# Patient Record
Sex: FEMALE | Race: OTHER | HISPANIC OR LATINO | Employment: FULL TIME | URBAN - METROPOLITAN AREA
[De-identification: names, ages, dates, MRNs, and addresses within clinical notes are randomized per-mention and may not be internally consistent; named-entity substitution may affect disease eponyms.]

---
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
## 2019-09-18 ENCOUNTER — APPOINTMENT (OUTPATIENT)
Dept: RADIOLOGY | Facility: MEDICAL CENTER | Age: 19
End: 2019-09-18
Attending: EMERGENCY MEDICINE
Payer: COMMERCIAL

## 2019-09-18 ENCOUNTER — HOSPITAL ENCOUNTER (EMERGENCY)
Facility: MEDICAL CENTER | Age: 19
End: 2019-09-18
Attending: EMERGENCY MEDICINE
Payer: COMMERCIAL

## 2019-09-18 VITALS
RESPIRATION RATE: 18 BRPM | BODY MASS INDEX: 34.53 KG/M2 | TEMPERATURE: 97.3 F | OXYGEN SATURATION: 97 % | HEIGHT: 65 IN | HEART RATE: 98 BPM | SYSTOLIC BLOOD PRESSURE: 124 MMHG | WEIGHT: 207.23 LBS | DIASTOLIC BLOOD PRESSURE: 70 MMHG

## 2019-09-18 DIAGNOSIS — M25.532 LEFT WRIST PAIN: ICD-10-CM

## 2019-09-18 PROCEDURE — 99284 EMERGENCY DEPT VISIT MOD MDM: CPT

## 2019-09-18 PROCEDURE — 73110 X-RAY EXAM OF WRIST: CPT | Mod: LT

## 2019-09-18 RX ORDER — NAPROXEN 500 MG/1
500 TABLET ORAL 2 TIMES DAILY WITH MEALS
Qty: 60 TAB | Refills: 0 | Status: SHIPPED | OUTPATIENT
Start: 2019-09-18 | End: 2022-10-07

## 2019-09-18 NOTE — ED PROVIDER NOTES
"ED Provider Note    Scribed for Travis Aparicio M.D. by Eneida Ramirez. 9/18/2019, 8:37 AM.    Primary care provider: None noted  Means of arrival: Walk-in  History obtained from: Patient  History limited by: None    CHIEF COMPLAINT  Chief Complaint   Patient presents with   • Wrist Pain     left wrist stiffness/pain denies injury    • Arm Pain     pt c/o left arm pain/burning sensation increasing over 1 wk        HPI  Sapna Patrick is a 19 y.o. female who presents to the Emergency Department for evaluation of acute, constant left wrist pain onset one week ago with worsening severity over the past few days prompting her to visit the ED today for further evaluation of her condition. The patient reports that she first began to experienced pain in her left wrist, however she recently has now also developed pain across her entire left arm. She states that her pain initially felt sore upon onset but she now describes her pain as burning in nature. No exacerbating or alleviating factors were reported. The patient does not report taking any over the counter medications for her left wrist pain. She reports symptoms of tingling to the left arm and decreased flexion and extension of the left wrist. Further endorses decreased strength to the left hand, she states \"I feel like I don't have enough strength to hold anything with my left hand\".  The patient notes that she is right handed and that she is a  where she spends a large majority of the time typing on the computer. No major past medical or surgical history was reported. The patient does not report taking any daily prescribed medications and has no known allergies to medications.       REVIEW OF SYSTEMS  See HPI above     PAST MEDICAL HISTORY  No major past medical history was reported.    SURGICAL HISTORY  patient denies any surgical history    SOCIAL HISTORY  Social History     Tobacco Use   • Smoking status: Never Smoker   • Smokeless tobacco: Never " "Used   Substance Use Topics   • Alcohol use: No   • Drug use: No      Social History     Substance and Sexual Activity   Drug Use No       FAMILY HISTORY  History reviewed. No pertinent family history.    CURRENT MEDICATIONS  The patient denies taking any daily medications    ALLERGIES  No Known Allergies    PHYSICAL EXAM  VITAL SIGNS: /114 Comment: 156/100 second pressure  Pulse (!) 104   Temp 36.3 °C (97.3 °F) (Temporal)   Resp 18   Ht 1.651 m (5' 5\")   Wt 94 kg (207 lb 3.7 oz)   LMP 09/04/2019   SpO2 95%   BMI 34.49 kg/m²     Constitutional: Well developed, Well nourished, No acute distress, Non-toxic appearance.   HENT: Neck is nontender  Abdomen: Soft, nontender nondistended.   Skin: Warm, Dry, No erythema  Extremities: Left wrist: mild tenderness whole aspect of the left wrist, decreased range of motion secondary to pain, good extension and flexion of fingers, normal distal sensation in tact. The left arm, shoulder and elbow are nontender.   Musculoskeletal: Intact distal pulses, no clubbing, no cyanosis. See extremities for further details.   Neurologic: Alert & oriented x 3, Normal motor function, Normal sensory function, No focal deficits noted.       RADIOLOGY  DX-WRIST-COMPLETE 3+ LEFT   Final Result      No evidence of fracture or dislocation.        The radiologist's interpretation of all radiological studies have been reviewed by me.    COURSE & MEDICAL DECISION MAKING  Nursing notes, VS, PMSFHx reviewed in chart.    8:37 AM - Patient seen and examined at bedside. Discussed plan of care with patient which includes obtaining an x-ray of her left wrist to further evaluate her condition. If her x-ray results have no significant findings, she will be given a referral to an orthopedist. Patient verbalizes her understanding and agreement to the plan of care. Ordered Dx-wrist left to evaluate her symptoms.     9:21 AM Recheck. The patient was informed that her radiology results show no evidence " of fractures or dislocations. Her left wrist will be placed in a velcro wrist splint for immobilization purposes and she will be discharged with anti-inflammatory medications for pain control. The patient was also instructed to follow up with an Orthopedist for further evaluation of her condition. The patient is stable for discharge. The patient will be discharged with a prescription for Naprosyn 500 mg which is to be taken twice daily. She was given a referral to Dr. Membreno and instructed to immediately return to the ED if her symptoms worsen. Patient verbalizes their understanding and agreement to plan of discharge.       Decision Making:  Patient presents for evaluation.  Clinically the patient has no signs of deformities this could very well be an overuse injury.  I will start the patient on anti-inflammatories as well as a wrist brace.  The patient is to follow-up as above return as needed.    The patient will return for new or worsening symptoms and is stable at the time of discharge.    DISPOSITION:  Patient will be discharged home in stable condition.    FOLLOW UP:  Francesco Membreno M.D.  [address] [facility] [address]  [phone]    Schedule an appointment as soon as possible for a visit   For further evaluation, Return if any symptoms worsen      OUTPATIENT MEDICATIONS:  Discharge Medication List as of 9/18/2019  9:28 AM      START taking these medications    Details   naproxen (NAPROSYN) 500 MG Tab Take 1 Tab by mouth 2 times a day, with meals., Disp-60 Tab, R-0, Print Rx Paper             FINAL IMPRESSION  1. Left wrist pain          Eneida MONTELONGO), am scribing for, and in the presence of, Travis Aparicio M.D..    Electronically signed by: Eneida Moreno), 9/18/2019    Travis MONTELONGO M.D. personally performed the services described in this documentation, as scribed by Eneida Ramirez in my presence, and it is both accurate and complete.    E    The note accurately reflects  work and decisions made by me.  Travis Aparicio  9/18/2019  3:10 PM

## 2019-09-18 NOTE — ED TRIAGE NOTES
Pt to triage .  Chief Complaint   Patient presents with   • Wrist Pain     left wrist stiffness/pain denies injury    • Arm Pain     pt c/o left arm pain/burning sensation increasing over 1 wk

## 2019-09-18 NOTE — ED NOTES
Pt aox4, resp equal unlabored, moving all extremities. Splint applied to left wrist. Given prescription for naprosyn. Pt understands to follow up with pcp and return to ed for worsening symptoms.

## 2021-05-30 ENCOUNTER — HOSPITAL ENCOUNTER (OUTPATIENT)
Dept: HOSPITAL 8 - LDOP | Age: 21
LOS: 1 days | Discharge: HOME | End: 2021-05-31
Attending: OBSTETRICS & GYNECOLOGY
Payer: MEDICAID

## 2021-05-30 VITALS — WEIGHT: 229.28 LBS | HEIGHT: 64 IN | BODY MASS INDEX: 39.14 KG/M2

## 2021-05-30 VITALS — DIASTOLIC BLOOD PRESSURE: 72 MMHG | SYSTOLIC BLOOD PRESSURE: 130 MMHG

## 2021-05-30 DIAGNOSIS — Z3A.20: ICD-10-CM

## 2021-05-30 DIAGNOSIS — O26.892: Primary | ICD-10-CM

## 2021-05-30 DIAGNOSIS — R10.9: ICD-10-CM

## 2021-05-30 LAB — MICROSCOPIC: (no result)

## 2021-05-30 PROCEDURE — G0463 HOSPITAL OUTPT CLINIC VISIT: HCPCS

## 2021-05-30 PROCEDURE — 76817 TRANSVAGINAL US OBSTETRIC: CPT

## 2021-05-30 PROCEDURE — 81001 URINALYSIS AUTO W/SCOPE: CPT

## 2021-05-30 PROCEDURE — 99211 OFF/OP EST MAY X REQ PHY/QHP: CPT

## 2021-05-30 PROCEDURE — 87086 URINE CULTURE/COLONY COUNT: CPT

## 2021-05-30 PROCEDURE — 76815 OB US LIMITED FETUS(S): CPT

## 2021-08-31 ENCOUNTER — HOSPITAL ENCOUNTER (OUTPATIENT)
Dept: HOSPITAL 8 - LDOP | Age: 21
Discharge: HOME | End: 2021-08-31
Attending: OBSTETRICS & GYNECOLOGY
Payer: COMMERCIAL

## 2021-08-31 DIAGNOSIS — Z3A.33: ICD-10-CM

## 2021-08-31 DIAGNOSIS — O16.3: ICD-10-CM

## 2021-08-31 DIAGNOSIS — O98.513: Primary | ICD-10-CM

## 2021-08-31 LAB
ALBUMIN SERPL-MCNC: 2.7 G/DL (ref 3.4–5)
ALP SERPL-CCNC: 140 U/L (ref 45–117)
ALT SERPL-CCNC: 20 U/L (ref 12–78)
ANION GAP SERPL CALC-SCNC: 5 MMOL/L (ref 5–15)
BASOPHILS # BLD AUTO: 0 X10^3/UL (ref 0–0.1)
BASOPHILS NFR BLD AUTO: 1 % (ref 0–1)
BILIRUB SERPL-MCNC: 0.2 MG/DL (ref 0.2–1)
CALCIUM SERPL-MCNC: 8.7 MG/DL (ref 8.5–10.1)
CHLORIDE SERPL-SCNC: 109 MMOL/L (ref 98–107)
CREAT SERPL-MCNC: 0.46 MG/DL (ref 0.55–1.02)
EOSINOPHIL # BLD AUTO: 0.1 X10^3/UL (ref 0–0.4)
EOSINOPHIL NFR BLD AUTO: 1 % (ref 1–7)
ERYTHROCYTE [DISTWIDTH] IN BLOOD BY AUTOMATED COUNT: 14.4 % (ref 9.6–15.2)
HBV CORE IGM SERPL QL IA: 38 MG/DL (ref 0–12)
HBV SURFACE AB SER RIA-ACNC: 255 MG/DL
LYMPHOCYTES # BLD AUTO: 1.4 X10^3/UL (ref 1–3.4)
LYMPHOCYTES NFR BLD AUTO: 20 % (ref 22–44)
MCH RBC QN AUTO: 28.8 PG (ref 27–34.8)
MCHC RBC AUTO-ENTMCNC: 33.3 G/DL (ref 32.4–35.8)
MICROSCOPIC: (no result)
MONOCYTES # BLD AUTO: 0.5 X10^3/UL (ref 0.2–0.8)
MONOCYTES NFR BLD AUTO: 6 % (ref 2–9)
NEUTROPHILS # BLD AUTO: 5.2 X10^3/UL (ref 1.8–6.8)
NEUTROPHILS NFR BLD AUTO: 72 % (ref 42–75)
PLATELET # BLD AUTO: 287 X10^3/UL (ref 130–400)
PMV BLD AUTO: 8.8 FL (ref 7.4–10.4)
PROT SERPL-MCNC: 6.8 G/DL (ref 6.4–8.2)
RBC # BLD AUTO: 4.1 X10^6/UL (ref 3.82–5.3)

## 2021-08-31 PROCEDURE — 87635 SARS-COV-2 COVID-19 AMP PRB: CPT

## 2021-08-31 PROCEDURE — 84156 ASSAY OF PROTEIN URINE: CPT

## 2021-08-31 PROCEDURE — 80053 COMPREHEN METABOLIC PANEL: CPT

## 2021-08-31 PROCEDURE — 82570 ASSAY OF URINE CREATININE: CPT

## 2021-08-31 PROCEDURE — 82248 BILIRUBIN DIRECT: CPT

## 2021-08-31 PROCEDURE — 81001 URINALYSIS AUTO W/SCOPE: CPT

## 2021-08-31 PROCEDURE — 59025 FETAL NON-STRESS TEST: CPT

## 2021-08-31 PROCEDURE — 36415 COLL VENOUS BLD VENIPUNCTURE: CPT

## 2021-08-31 PROCEDURE — 85025 COMPLETE CBC W/AUTO DIFF WBC: CPT

## 2021-08-31 PROCEDURE — 84550 ASSAY OF BLOOD/URIC ACID: CPT

## 2022-06-21 ENCOUNTER — APPOINTMENT (OUTPATIENT)
Dept: RADIOLOGY | Facility: MEDICAL CENTER | Age: 22
End: 2022-06-21
Attending: OBSTETRICS & GYNECOLOGY
Payer: COMMERCIAL

## 2022-10-07 ENCOUNTER — PRE-ADMISSION TESTING (OUTPATIENT)
Dept: ADMISSIONS | Facility: MEDICAL CENTER | Age: 22
End: 2022-10-07
Attending: SURGERY
Payer: COMMERCIAL

## 2022-10-07 NOTE — PREPROCEDURE INSTRUCTIONS
Pre admit appointment via telephone. History and medications reviewed. Pre-op instructions given, hand outs reviewed and questions answered. Pre admit video emailed.    Anesthesia fasting guidelines reviewed. Patient instructed to continue regularly prescribed medications through the day before surgery. Per anesthesia protocol, patient instructed to take these medications with a sip of water the day of surgery: none.

## 2022-10-10 ENCOUNTER — APPOINTMENT (OUTPATIENT)
Dept: ADMISSIONS | Facility: MEDICAL CENTER | Age: 22
End: 2022-10-10
Attending: SURGERY
Payer: COMMERCIAL

## 2022-10-10 DIAGNOSIS — Z01.812 PRE-OPERATIVE LABORATORY EXAMINATION: ICD-10-CM

## 2022-10-10 LAB
ERYTHROCYTE [DISTWIDTH] IN BLOOD BY AUTOMATED COUNT: 44.9 FL (ref 35.9–50)
HCT VFR BLD AUTO: 41.7 % (ref 37–47)
HGB BLD-MCNC: 13.4 G/DL (ref 12–16)
MCH RBC QN AUTO: 27.2 PG (ref 27–33)
MCHC RBC AUTO-ENTMCNC: 32.1 G/DL (ref 33.6–35)
MCV RBC AUTO: 84.6 FL (ref 81.4–97.8)
PLATELET # BLD AUTO: 344 K/UL (ref 164–446)
PMV BLD AUTO: 10.8 FL (ref 9–12.9)
RBC # BLD AUTO: 4.93 M/UL (ref 4.2–5.4)
WBC # BLD AUTO: 6 K/UL (ref 4.8–10.8)

## 2022-10-10 PROCEDURE — 36415 COLL VENOUS BLD VENIPUNCTURE: CPT

## 2022-10-10 PROCEDURE — 85027 COMPLETE CBC AUTOMATED: CPT

## 2022-10-20 ENCOUNTER — ANESTHESIA EVENT (OUTPATIENT)
Dept: SURGERY | Facility: MEDICAL CENTER | Age: 22
End: 2022-10-20

## 2022-10-21 ENCOUNTER — HOSPITAL ENCOUNTER (OUTPATIENT)
Facility: MEDICAL CENTER | Age: 22
End: 2022-10-21
Attending: SURGERY | Admitting: SURGERY
Payer: COMMERCIAL

## 2022-10-21 ENCOUNTER — ANESTHESIA (OUTPATIENT)
Dept: SURGERY | Facility: MEDICAL CENTER | Age: 22
End: 2022-10-21
Payer: COMMERCIAL

## 2022-10-21 VITALS
TEMPERATURE: 97.5 F | RESPIRATION RATE: 16 BRPM | DIASTOLIC BLOOD PRESSURE: 75 MMHG | HEART RATE: 76 BPM | WEIGHT: 238.43 LBS | HEIGHT: 65 IN | OXYGEN SATURATION: 93 % | SYSTOLIC BLOOD PRESSURE: 140 MMHG | BODY MASS INDEX: 39.72 KG/M2

## 2022-10-21 DIAGNOSIS — G89.18 POST-OPERATIVE PAIN: ICD-10-CM

## 2022-10-21 PROBLEM — K43.2 INCISIONAL HERNIA: Status: ACTIVE | Noted: 2022-10-21

## 2022-10-21 LAB — HCG UR QL: NEGATIVE

## 2022-10-21 PROCEDURE — 160048 HCHG OR STATISTICAL LEVEL 1-5: Performed by: SURGERY

## 2022-10-21 PROCEDURE — 700102 HCHG RX REV CODE 250 W/ 637 OVERRIDE(OP): Performed by: ANESTHESIOLOGY

## 2022-10-21 PROCEDURE — 160046 HCHG PACU - 1ST 60 MINS PHASE II: Performed by: SURGERY

## 2022-10-21 PROCEDURE — 160002 HCHG RECOVERY MINUTES (STAT): Performed by: SURGERY

## 2022-10-21 PROCEDURE — C1781 MESH (IMPLANTABLE): HCPCS | Performed by: SURGERY

## 2022-10-21 PROCEDURE — 502714 HCHG ROBOTIC SURGERY SERVICES: Performed by: SURGERY

## 2022-10-21 PROCEDURE — A9270 NON-COVERED ITEM OR SERVICE: HCPCS | Performed by: ANESTHESIOLOGY

## 2022-10-21 PROCEDURE — 160009 HCHG ANES TIME/MIN: Performed by: SURGERY

## 2022-10-21 PROCEDURE — 160031 HCHG SURGERY MINUTES - 1ST 30 MINS LEVEL 5: Performed by: SURGERY

## 2022-10-21 PROCEDURE — 00840 ANES IPER PX LOWER ABD NOS: CPT | Performed by: ANESTHESIOLOGY

## 2022-10-21 PROCEDURE — 160042 HCHG SURGERY MINUTES - EA ADDL 1 MIN LEVEL 5: Performed by: SURGERY

## 2022-10-21 PROCEDURE — 700105 HCHG RX REV CODE 258: Performed by: SURGERY

## 2022-10-21 PROCEDURE — 700101 HCHG RX REV CODE 250: Performed by: ANESTHESIOLOGY

## 2022-10-21 PROCEDURE — 160025 RECOVERY II MINUTES (STATS): Performed by: SURGERY

## 2022-10-21 PROCEDURE — 700111 HCHG RX REV CODE 636 W/ 250 OVERRIDE (IP): Performed by: ANESTHESIOLOGY

## 2022-10-21 PROCEDURE — 160035 HCHG PACU - 1ST 60 MINS PHASE I: Performed by: SURGERY

## 2022-10-21 PROCEDURE — 81025 URINE PREGNANCY TEST: CPT

## 2022-10-21 PROCEDURE — 700101 HCHG RX REV CODE 250: Performed by: SURGERY

## 2022-10-21 DEVICE — MESH PROGRIP LAPROSCOPIC SELF FIXATING (1/CA): Type: IMPLANTABLE DEVICE | Status: FUNCTIONAL

## 2022-10-21 RX ORDER — CEFAZOLIN SODIUM 1 G/3ML
INJECTION, POWDER, FOR SOLUTION INTRAMUSCULAR; INTRAVENOUS PRN
Status: DISCONTINUED | OUTPATIENT
Start: 2022-10-21 | End: 2022-10-21 | Stop reason: SURG

## 2022-10-21 RX ORDER — ONDANSETRON 2 MG/ML
INJECTION INTRAMUSCULAR; INTRAVENOUS PRN
Status: DISCONTINUED | OUTPATIENT
Start: 2022-10-21 | End: 2022-10-21 | Stop reason: SURG

## 2022-10-21 RX ORDER — DIPHENHYDRAMINE HYDROCHLORIDE 50 MG/ML
12.5 INJECTION INTRAMUSCULAR; INTRAVENOUS
Status: DISCONTINUED | OUTPATIENT
Start: 2022-10-21 | End: 2022-10-21 | Stop reason: HOSPADM

## 2022-10-21 RX ORDER — SODIUM CHLORIDE, SODIUM LACTATE, POTASSIUM CHLORIDE, CALCIUM CHLORIDE 600; 310; 30; 20 MG/100ML; MG/100ML; MG/100ML; MG/100ML
INJECTION, SOLUTION INTRAVENOUS CONTINUOUS
Status: ACTIVE | OUTPATIENT
Start: 2022-10-21 | End: 2022-10-21

## 2022-10-21 RX ORDER — HYDROMORPHONE HYDROCHLORIDE 1 MG/ML
0.4 INJECTION, SOLUTION INTRAMUSCULAR; INTRAVENOUS; SUBCUTANEOUS
Status: DISCONTINUED | OUTPATIENT
Start: 2022-10-21 | End: 2022-10-21 | Stop reason: HOSPADM

## 2022-10-21 RX ORDER — OXYCODONE HCL 5 MG/5 ML
5 SOLUTION, ORAL ORAL
Status: DISCONTINUED | OUTPATIENT
Start: 2022-10-21 | End: 2022-10-21 | Stop reason: HOSPADM

## 2022-10-21 RX ORDER — SCOLOPAMINE TRANSDERMAL SYSTEM 1 MG/1
1 PATCH, EXTENDED RELEASE TRANSDERMAL
Status: DISCONTINUED | OUTPATIENT
Start: 2022-10-21 | End: 2022-10-21 | Stop reason: HOSPADM

## 2022-10-21 RX ORDER — SODIUM CHLORIDE, SODIUM LACTATE, POTASSIUM CHLORIDE, CALCIUM CHLORIDE 600; 310; 30; 20 MG/100ML; MG/100ML; MG/100ML; MG/100ML
INJECTION, SOLUTION INTRAVENOUS CONTINUOUS
Status: DISCONTINUED | OUTPATIENT
Start: 2022-10-21 | End: 2022-10-21 | Stop reason: HOSPADM

## 2022-10-21 RX ORDER — HALOPERIDOL 5 MG/ML
1 INJECTION INTRAMUSCULAR
Status: DISCONTINUED | OUTPATIENT
Start: 2022-10-21 | End: 2022-10-21 | Stop reason: HOSPADM

## 2022-10-21 RX ORDER — HYDROMORPHONE HYDROCHLORIDE 1 MG/ML
0.1 INJECTION, SOLUTION INTRAMUSCULAR; INTRAVENOUS; SUBCUTANEOUS
Status: DISCONTINUED | OUTPATIENT
Start: 2022-10-21 | End: 2022-10-21 | Stop reason: HOSPADM

## 2022-10-21 RX ORDER — ACETAMINOPHEN 500 MG
1000 TABLET ORAL ONCE
Status: COMPLETED | OUTPATIENT
Start: 2022-10-21 | End: 2022-10-21

## 2022-10-21 RX ORDER — BUPIVACAINE HYDROCHLORIDE AND EPINEPHRINE 5; 5 MG/ML; UG/ML
INJECTION, SOLUTION EPIDURAL; INTRACAUDAL; PERINEURAL
Status: DISCONTINUED | OUTPATIENT
Start: 2022-10-21 | End: 2022-10-21 | Stop reason: HOSPADM

## 2022-10-21 RX ORDER — DEXAMETHASONE SODIUM PHOSPHATE 4 MG/ML
INJECTION, SOLUTION INTRA-ARTICULAR; INTRALESIONAL; INTRAMUSCULAR; INTRAVENOUS; SOFT TISSUE PRN
Status: DISCONTINUED | OUTPATIENT
Start: 2022-10-21 | End: 2022-10-21 | Stop reason: SURG

## 2022-10-21 RX ORDER — ONDANSETRON 2 MG/ML
4 INJECTION INTRAMUSCULAR; INTRAVENOUS
Status: DISCONTINUED | OUTPATIENT
Start: 2022-10-21 | End: 2022-10-21 | Stop reason: HOSPADM

## 2022-10-21 RX ORDER — MEPERIDINE HYDROCHLORIDE 25 MG/ML
6.25 INJECTION INTRAMUSCULAR; INTRAVENOUS; SUBCUTANEOUS
Status: DISCONTINUED | OUTPATIENT
Start: 2022-10-21 | End: 2022-10-21 | Stop reason: HOSPADM

## 2022-10-21 RX ORDER — MIDAZOLAM HYDROCHLORIDE 1 MG/ML
INJECTION INTRAMUSCULAR; INTRAVENOUS PRN
Status: DISCONTINUED | OUTPATIENT
Start: 2022-10-21 | End: 2022-10-21 | Stop reason: SURG

## 2022-10-21 RX ORDER — HYDROMORPHONE HYDROCHLORIDE 2 MG/ML
INJECTION, SOLUTION INTRAMUSCULAR; INTRAVENOUS; SUBCUTANEOUS PRN
Status: DISCONTINUED | OUTPATIENT
Start: 2022-10-21 | End: 2022-10-21 | Stop reason: SURG

## 2022-10-21 RX ORDER — LIDOCAINE HYDROCHLORIDE 20 MG/ML
INJECTION, SOLUTION EPIDURAL; INFILTRATION; INTRACAUDAL; PERINEURAL PRN
Status: DISCONTINUED | OUTPATIENT
Start: 2022-10-21 | End: 2022-10-21 | Stop reason: SURG

## 2022-10-21 RX ORDER — ROCURONIUM BROMIDE 10 MG/ML
INJECTION, SOLUTION INTRAVENOUS PRN
Status: DISCONTINUED | OUTPATIENT
Start: 2022-10-21 | End: 2022-10-21 | Stop reason: SURG

## 2022-10-21 RX ORDER — LIDOCAINE HYDROCHLORIDE 40 MG/ML
SOLUTION TOPICAL PRN
Status: DISCONTINUED | OUTPATIENT
Start: 2022-10-21 | End: 2022-10-21 | Stop reason: SURG

## 2022-10-21 RX ORDER — CELECOXIB 200 MG/1
200 CAPSULE ORAL ONCE
Status: COMPLETED | OUTPATIENT
Start: 2022-10-21 | End: 2022-10-21

## 2022-10-21 RX ORDER — HYDROMORPHONE HYDROCHLORIDE 1 MG/ML
0.2 INJECTION, SOLUTION INTRAMUSCULAR; INTRAVENOUS; SUBCUTANEOUS
Status: DISCONTINUED | OUTPATIENT
Start: 2022-10-21 | End: 2022-10-21 | Stop reason: HOSPADM

## 2022-10-21 RX ORDER — OXYCODONE HCL 5 MG/5 ML
10 SOLUTION, ORAL ORAL
Status: DISCONTINUED | OUTPATIENT
Start: 2022-10-21 | End: 2022-10-21 | Stop reason: HOSPADM

## 2022-10-21 RX ORDER — OXYCODONE HYDROCHLORIDE 5 MG/1
5 TABLET ORAL EVERY 4 HOURS PRN
Qty: 30 TABLET | Refills: 0 | Status: SHIPPED | OUTPATIENT
Start: 2022-10-21 | End: 2022-10-28

## 2022-10-21 RX ADMIN — PROPOFOL 250 MG: 10 INJECTION, EMULSION INTRAVENOUS at 08:14

## 2022-10-21 RX ADMIN — LIDOCAINE HYDROCHLORIDE 0.5 ML: 10 INJECTION, SOLUTION EPIDURAL; INFILTRATION; INTRACAUDAL; PERINEURAL at 07:08

## 2022-10-21 RX ADMIN — HYDROMORPHONE HYDROCHLORIDE 0.5 MG: 2 INJECTION INTRAMUSCULAR; INTRAVENOUS; SUBCUTANEOUS at 09:25

## 2022-10-21 RX ADMIN — ROCURONIUM BROMIDE 10 MG: 10 INJECTION, SOLUTION INTRAVENOUS at 09:13

## 2022-10-21 RX ADMIN — FENTANYL CITRATE 50 MCG: 50 INJECTION, SOLUTION INTRAMUSCULAR; INTRAVENOUS at 08:09

## 2022-10-21 RX ADMIN — CEFAZOLIN 2 G: 330 INJECTION, POWDER, FOR SOLUTION INTRAMUSCULAR; INTRAVENOUS at 08:18

## 2022-10-21 RX ADMIN — MIDAZOLAM HYDROCHLORIDE 2 MG: 1 INJECTION, SOLUTION INTRAMUSCULAR; INTRAVENOUS at 08:09

## 2022-10-21 RX ADMIN — SODIUM CHLORIDE, POTASSIUM CHLORIDE, SODIUM LACTATE AND CALCIUM CHLORIDE: 600; 310; 30; 20 INJECTION, SOLUTION INTRAVENOUS at 07:08

## 2022-10-21 RX ADMIN — ONDANSETRON 4 MG: 2 INJECTION INTRAMUSCULAR; INTRAVENOUS at 09:23

## 2022-10-21 RX ADMIN — LIDOCAINE HYDROCHLORIDE 4 ML: 40 SOLUTION TOPICAL at 08:16

## 2022-10-21 RX ADMIN — HYDROMORPHONE HYDROCHLORIDE 0.5 MG: 2 INJECTION INTRAMUSCULAR; INTRAVENOUS; SUBCUTANEOUS at 09:29

## 2022-10-21 RX ADMIN — HYDROMORPHONE HYDROCHLORIDE 0.5 MG: 2 INJECTION INTRAMUSCULAR; INTRAVENOUS; SUBCUTANEOUS at 09:13

## 2022-10-21 RX ADMIN — DEXAMETHASONE SODIUM PHOSPHATE 8 MG: 4 INJECTION, SOLUTION INTRA-ARTICULAR; INTRALESIONAL; INTRAMUSCULAR; INTRAVENOUS; SOFT TISSUE at 08:20

## 2022-10-21 RX ADMIN — SUGAMMADEX 200 MG: 100 INJECTION, SOLUTION INTRAVENOUS at 09:27

## 2022-10-21 RX ADMIN — LIDOCAINE HYDROCHLORIDE 50 MG: 20 INJECTION, SOLUTION EPIDURAL; INFILTRATION; INTRACAUDAL at 08:33

## 2022-10-21 RX ADMIN — SCOPALAMINE 1 PATCH: 1 PATCH, EXTENDED RELEASE TRANSDERMAL at 07:07

## 2022-10-21 RX ADMIN — ACETAMINOPHEN 1000 MG: 500 TABLET ORAL at 07:08

## 2022-10-21 RX ADMIN — FENTANYL CITRATE 50 MCG: 50 INJECTION, SOLUTION INTRAMUSCULAR; INTRAVENOUS at 08:14

## 2022-10-21 RX ADMIN — ROCURONIUM BROMIDE 70 MG: 10 INJECTION, SOLUTION INTRAVENOUS at 08:14

## 2022-10-21 RX ADMIN — CELECOXIB 200 MG: 200 CAPSULE ORAL at 07:08

## 2022-10-21 RX ADMIN — LIDOCAINE HYDROCHLORIDE 50 MG: 20 INJECTION, SOLUTION EPIDURAL; INFILTRATION; INTRACAUDAL at 08:14

## 2022-10-21 ASSESSMENT — PAIN SCALES - GENERAL: PAIN_LEVEL: 0

## 2022-10-21 NOTE — OR NURSING
1026 Pt transferred to stage 2, assisted into clothing.        1105 Pt discharged home with  after an uneventful stay in stage 2. Discharge instructions reviewed, questions answered. PIV removed, tip intact. Pt escorted out in wheelchair by RN.

## 2022-10-21 NOTE — OR NURSING
Procedure, patient allergies and NPO status verified. Home med rec completed and belongings secured. Patient verbalizes understanding of pain scale, expected course of stay and plan of care.  IV access established. SCD placed on bilateral legs. Triple aim and site prep completed by CNA

## 2022-10-21 NOTE — ANESTHESIA PREPROCEDURE EVALUATION
Case: [id number] Date/Time: 10/21/22 0745    Procedure: ROBOTIC INCISIONAL HERNIA REPAIR    Pre-op diagnosis: VENTRAL INCISIONAL HERNIA    Location: [facility] OR 01 / SURGERY [facility]    Surgeons: Sabino Soares M.D.        21 yo F here for robotic incisional hernia repair.  Denies problems with anesthesia in the past.  No current CP/SOB/N/V symptoms.    NPO  B-HCG neg  Relevant Problems   No relevant active problems       Physical Exam    Airway   Mallampati: III  TM distance: >3 FB  Neck ROM: full       Cardiovascular - normal exam  Rhythm: regular  Rate: normal  (-) murmur     Dental - normal exam           Pulmonary - normal exam  Breath sounds clear to auscultation     Abdominal   (+) obese     Neurological - normal exam               Anesthesia Plan    ASA 2       Plan - general       Airway plan will be ETT          Induction: intravenous    Postoperative Plan: Postoperative administration of opioids is intended.    Pertinent diagnostic labs and testing reviewed    Informed Consent:    Anesthetic plan and risks discussed with patient and spouse.    Use of blood products discussed with: patient whom consented to blood products.

## 2022-10-21 NOTE — ANESTHESIA PROCEDURE NOTES
Airway    Date/Time: 10/21/2022 8:16 AM  Performed by: Kylah Marroquin M.D.  Authorized by: Kylah Marroquin M.D.     Location:  OR  Urgency:  Elective  Difficult Airway: No    Indications for Airway Management:  Anesthesia      Spontaneous Ventilation: absent    Sedation Level:  Deep  Preoxygenated: Yes    Patient Position:  Sniffing  Mask Difficulty Assessment:  2 - vent by mask + OA or adjuvant +/- NMBA  Final Airway Type:  Endotracheal airway  Final Endotracheal Airway:  ETT  Cuffed: Yes    Technique Used for Successful ETT Placement:  Direct laryngoscopy  Devices/Methods Used in Placement:  Intubating stylet    Insertion Site:  Oral  Blade Type:  Yaneli  Laryngoscope Blade/Videolaryngoscope Blade Size:  3  ETT Size (mm):  7.0  Measured from:  Teeth  ETT to Teeth (cm):  21  Placement Verified by: auscultation and capnometry    Cormack-Lehane Classification:  Grade IIa - partial view of glottis  Number of Attempts at Approach:  1

## 2022-10-21 NOTE — DISCHARGE INSTRUCTIONS
If any questions arise, call your provider, Sabino Soares M.D.[phone].  If your provider is not available, please feel free to call the Surgical Center at [phone].    MEDICATIONS: Resume taking daily medication.  Take prescribed pain medication with food.  If no medication is prescribed, you may take non-aspirin pain medication if needed.  PAIN MEDICATION CAN BE VERY CONSTIPATING.  Take a stool softener or laxative such as senokot, pericolace, or milk of magnesia if needed.    Last pain medication given at     What to Expect Post Anesthesia    Rest and take it easy for the first 24 hours.  A responsible adult is recommended to remain with you during that time.  It is normal to feel sleepy.  We encourage you to not do anything that requires balance, judgment or coordination.    FOR 24 HOURS DO NOT:  Drive, operate machinery or run household appliances.  Drink beer or alcoholic beverages.  Make important decisions or sign legal documents.    To avoid nausea, slowly advance diet as tolerated, avoiding spicy or greasy foods for the first day.  Add more substantial food to your diet according to your provider's instructions. INCREASE FLUIDS AND FIBER TO AVOID CONSTIPATION.    MILD FLU-LIKE SYMPTOMS ARE NORMAL.  YOU MAY EXPERIENCE GENERALIZED MUSCLE ACHES, THROAT IRRITATION, HEADACHE AND/OR SOME NAUSEA.    No heavy lifting x6 weeks (<15lbs)  Ok to shower tomorrrow, do not bath or swim for 2 weeks    Laparoscopic Hernia Repair, Care After  This sheet gives you information about how to care for yourself after your procedure. Your health care provider may also give you more specific instructions. If you have problems or questions, contact your health care provider.  What can I expect after the procedure?  After the procedure, it is common to have:  Pain, discomfort, or soreness.  Follow these instructions at home:  Incision care  Follow instructions from your health care provider about how to take care  of your incision. Make sure you:  Wash your hands with soap and water before you change your bandage (dressing) or before you touch your abdomen. If soap and water are not available, use hand .  Change your dressing as told by your health care provider.  Leave stitches (sutures), skin glue, or adhesive strips in place. These skin closures may need to stay in place for 2 weeks or longer. If adhesive strip edges start to loosen and curl up, you may trim the loose edges. Do not remove adhesive strips completely unless your health care provider tells you to do that.  Check your incision area every day for signs of infection. Check for:  Redness, swelling, or pain.  Fluid or blood.  Warmth.  Pus or a bad smell.  Bathing  Do not take baths, swim, or use a hot tub until your health care provider approves. Ask your health care provider if you can take showers. You may only be allowed to take sponge baths for bathing.  Keep your bandage (dressing) dry until your health care provider says it can be removed.  Activity  Do not lift anything that is heavier than 10 lb (4.5 kg) until your health care provider approves.  Do not drive or use heavy machinery while taking prescription pain medicine. Ask your health care provider when it is safe for you to drive or use heavy machinery.  Do not drive for 24 hours if you were given a medicine to help you relax (sedative) during your procedure.  Rest as told by your health care provider. You may return to your normal activities when your health care provider approves.  General instructions  Take over-the-counter and prescription medicines only as told by your health care provider.  To prevent or treat constipation while you are taking prescription pain medicine, your health care provider may recommend that you:  Take over-the-counter or prescription medicines.  Eat foods that are high in fiber, such as fresh fruits and vegetables, whole grains, and beans.  Limit foods that are  high in fat and processed sugars, such as fried and sweet foods.  Drink enough fluid to keep your urine clear or pale yellow.  Hold a pillow over your abdomen when you cough or sneeze. This helps with pain.  Keep all follow-up visits as told by your health care provider. This is important.  Contact a health care provider if:  You have:  A fever or chills.  Redness, swelling, or pain around your incision.  Fluid or blood coming from your incision.  Pus or a bad smell coming from your incision.  Pain that gets worse or does not get better with medicine.  Nausea or vomiting.  A cough.  Shortness of breath.  Your incision feels warm to the touch.  You have not had a bowel movement in three days.  You are not able to urinate.  Get help right away if:  You have severe pain in your abdomen.  You have persistent nausea and vomiting.  You have redness, warmth, or pain in your leg.  You have chest pain.  You have trouble breathing.  Summary  After this procedure, it is common to have pain, discomfort, or soreness.  Follow instructions from your health care provider about how to take care of your incision.  Check your incision area every day for signs of infection. Report any signs of infection to your health care provider.  Keep all follow-up visits as told by your health care provider. This is important.  This information is not intended to replace advice given to you by your health care provider. Make sure you discuss any questions you have with your health care provider.  Document Released: 12/04/2013 Document Revised: 11/30/2018 Document Reviewed: 08/09/2017  [employer] Patient Education © 2020 [employer]

## 2022-10-21 NOTE — ANESTHESIA TIME REPORT
Anesthesia Start and Stop Event Times     Date Time Event    10/21/2022 0749 Ready for Procedure     0809 Anesthesia Start     0936 Anesthesia Stop        Responsible Staff  10/21/22    Name Role Begin End    Kylah Marroquin M.D. Anesth 0809 0936        Overtime Reason:  no overtime (within assigned shift)    Comments:

## 2022-10-21 NOTE — OR NURSING
0934: Pt arrived to PACU. Respirations even and unlabored. VSS. Dressing clean, dry and intact, able to move all extremities, warm to touch.      0949: Respirations even and unlabored, pain 0/10 and tolerable, denies nausea, answering questions appropriately     1004: Respirations even and unlabored, pain 0/10 and tolerable, denies nausea,  updated and on his way back to waiting room    1019: Respirations even and unlabored, pain 0/10 and tolerable, denies nausea, tolerating water, meets stage II    1020: Report given to Marissa ORTEGA Stage II    1026: transferred to stage II

## 2022-10-21 NOTE — ANESTHESIA POSTPROCEDURE EVALUATION
Patient: Sapna Patrick    Procedure Summary     Date: 10/21/22 Room / Location: [place] [address] / SURGERY [facility]    Anesthesia Start: 0809 Anesthesia Stop:     Procedure: ROBOTIC INCISIONAL HERNIA REPAIR Diagnosis: (VENTRAL INCISIONAL HERNIA)    Surgeons: Sabino Soares M.D. Responsible Provider: Kylah Marroquin M.D.    Anesthesia Type: general ASA Status: 2          Final Anesthesia Type: general  Last vitals  BP   150/60   Temp   36.8   Pulse   70   Resp   16    SpO2   100%     Anesthesia Post Evaluation    Patient location during evaluation: PACU  Patient participation: complete - patient participated  Level of consciousness: sleepy but conscious  Pain score: 0    Airway patency: patent  Anesthetic complications: no  Cardiovascular status: hemodynamically stable  Respiratory status: acceptable  Hydration status: euvolemic    PONV: none          No notable events documented.     Nurse Pain Score: 0 (NPRS)

## 2022-10-21 NOTE — OP REPORT
OP Note    PreOp Diagnosis:   1.  Left lower quadrant incisional hernia     PostOp Diagnosis:   1.  Left lower quadrant incisional hernia    Procedure(s):  1.  Robotic assisted laparoscopic incisional hernia repair left lower quadrant    - Wound Class: Clean    Surgeon:  Sabino Soares MD    Assistant:  Ammy Barton MD    Anesthesiologist/Type of Anesthesia:  Anesthesiologist: Kylah Marroquin M.D./General    Surgical Staff:  Circulator: Rashmi Dyson Circulator: Aimee Cavazos  Scrub Person: Benjamin Reyes    Specimens removed if any:  * No specimens in log *    Estimated Blood Loss: 20 cc h    Findings: Left lower quadrant incisional hernia.  The use of an assistant was required for assistance with docking of the robot, exchange of instruments, exposure and closure of incisions.    Complications: None observed    [employer]  The patient was met in the preoperative holding area where all of the potential risks and benefits of the procedure were discussed in detail with the patient. All of her questions were answered to her satisfaction and informed consent was signed. The patient was taken back to the operating room and placed supine on the operating room table. General endotracheal anesthesia was induced and all of the patients pressure points were padded appropriately. The patients abdomen was prepped and draped in the usual sterile fashion.  A timeout was performed which correctly identified the patient and the procedure being performed and preoperative antibiotics were given according to SCIP guidelines.     The procedure was started with infiltration of 0.5% marcaine into the right upper quadrant skin. An 8mm incision was made and carried down to fascia. The verress needle was inserted into the abdominal cavity and insufflation carried out to 15mmHg.  An 8mm robotic trochar was inserted into the abdominal cavity. The camera was inserted into the abdominal cavity and a brief survey of  the abdominal cavity demonstrated no obvious injuries or pathologies other than left lower quadrant incisional hernia with incarcerated omentum.  Two additional 8mm robotic trochars were then placed along the right abdominal wall after the skin and peritoneum were infiltrated with 0.5% marcaine with epinephrine. The robot was then docked and I took my position at the console.    I created a preperitoneal flap using a combination of electrocautery and sharp dissection. I dissected to the hernia which I reduced under direct visualization. It was fat-containing. I created a large preperitoneal space with care not to make defects in the peritoneum. I then used a  23 cm V-Loc Stratisfix suture to reapproximate the hernia defect and used a measuring device to measure the preperitoneal space. I used a 10 x 15 cm ProGrip mesh which I fashioned a fit the preperitoneal space at cm x 8cm. This had excellent coverage of the defect and laid flat against the abdominal wall. I closed the the peritoneal flap with a 2-0 Stratisfix suture. I used vicryl to close small rents in the peritoneum made during dissection.  This completely had the mesh from the abdominal cavity. Hemostasis was meticulously achieved. The needles were withdrawn under direct visualization.  The robot was then undocked.  Monocryl suture was used to re-approximate the skin edges. Dermabond was applied as a dressing.     The patient tolerated the procedure well and was extubated at the conclusion of the case without incident. All of the sponge, needle and instrument counts were correct at the conclusion of the case. After he was awoken from anesthesia he was taken to the recovery room in stable condition.         10/21/2022 9:32 AM Sabino Soares M.D.

## 2023-02-28 ENCOUNTER — HOSPITAL ENCOUNTER (EMERGENCY)
Facility: MEDICAL CENTER | Age: 23
End: 2023-02-28
Attending: EMERGENCY MEDICINE
Payer: COMMERCIAL

## 2023-02-28 ENCOUNTER — APPOINTMENT (OUTPATIENT)
Dept: RADIOLOGY | Facility: MEDICAL CENTER | Age: 23
End: 2023-02-28
Attending: EMERGENCY MEDICINE

## 2023-02-28 VITALS
WEIGHT: 247.8 LBS | BODY MASS INDEX: 38.89 KG/M2 | OXYGEN SATURATION: 99 % | DIASTOLIC BLOOD PRESSURE: 81 MMHG | SYSTOLIC BLOOD PRESSURE: 126 MMHG | TEMPERATURE: 98.5 F | HEART RATE: 75 BPM | HEIGHT: 67 IN | RESPIRATION RATE: 18 BRPM
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
## 2023-02-28 DIAGNOSIS — S09.90XA CLOSED HEAD INJURY, INITIAL ENCOUNTER: ICD-10-CM

## 2023-02-28 DIAGNOSIS — S16.1XXA STRAIN OF NECK MUSCLE, INITIAL ENCOUNTER: ICD-10-CM

## 2023-02-28 DIAGNOSIS — S43.401A SPRAIN OF RIGHT SHOULDER, UNSPECIFIED SHOULDER SPRAIN TYPE, INITIAL ENCOUNTER: ICD-10-CM

## 2023-02-28 PROCEDURE — 72125 CT NECK SPINE W/O DYE: CPT

## 2023-02-28 PROCEDURE — 73030 X-RAY EXAM OF SHOULDER: CPT | Mod: RT

## 2023-02-28 PROCEDURE — 70450 CT HEAD/BRAIN W/O DYE: CPT

## 2023-02-28 PROCEDURE — 99284 EMERGENCY DEPT VISIT MOD MDM: CPT

## 2023-02-28 ASSESSMENT — FIBROSIS 4 INDEX: FIB4 SCORE: 0.17
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
## 2024-06-22 ENCOUNTER — APPOINTMENT (OUTPATIENT)
Dept: RADIOLOGY | Facility: MEDICAL CENTER | Age: 24
End: 2024-06-22
Attending: FAMILY MEDICINE
Payer: COMMERCIAL

## 2024-08-17 ENCOUNTER — HOSPITAL ENCOUNTER (OUTPATIENT)
Dept: RADIOLOGY | Facility: MEDICAL CENTER | Age: 24
End: 2024-08-17
Attending: FAMILY MEDICINE
Payer: COMMERCIAL

## 2024-08-17 PROCEDURE — 70553 MRI BRAIN STEM W/O & W/DYE: CPT

## 2024-08-17 PROCEDURE — A9579 GAD-BASE MR CONTRAST NOS,1ML: HCPCS | Mod: JZ | Performed by: FAMILY MEDICINE

## 2024-08-17 PROCEDURE — 700117 HCHG RX CONTRAST REV CODE 255: Mod: JZ | Performed by: FAMILY MEDICINE

## 2024-08-17 RX ADMIN — GADOTERIDOL 20 ML: 279.3 INJECTION, SOLUTION INTRAVENOUS at 08:17

## 2024-11-29 ENCOUNTER — OFFICE VISIT (OUTPATIENT)
Dept: URGENT CARE | Facility: PHYSICIAN GROUP | Age: 24
End: 2024-11-29
Payer: COMMERCIAL

## 2024-11-29 VITALS
HEIGHT: 66 IN | SYSTOLIC BLOOD PRESSURE: 102 MMHG | OXYGEN SATURATION: 96 % | BODY MASS INDEX: 42.11 KG/M2 | DIASTOLIC BLOOD PRESSURE: 64 MMHG | HEART RATE: 73 BPM | WEIGHT: 262 LBS | RESPIRATION RATE: 18 BRPM | TEMPERATURE: 97.3 F

## 2024-11-29 DIAGNOSIS — R06.2 WHEEZE: ICD-10-CM

## 2024-11-29 DIAGNOSIS — R05.1 ACUTE COUGH: ICD-10-CM

## 2024-11-29 RX ORDER — DEXTROMETHORPHAN HYDROBROMIDE AND PROMETHAZINE HYDROCHLORIDE 15; 6.25 MG/5ML; MG/5ML
5 SYRUP ORAL 4 TIMES DAILY PRN
Qty: 120 ML | Refills: 0 | Status: SHIPPED | OUTPATIENT
Start: 2024-11-29

## 2024-11-29 RX ORDER — ALBUTEROL SULFATE 90 UG/1
2 INHALANT RESPIRATORY (INHALATION) EVERY 4 HOURS PRN
Qty: 1 EACH | Refills: 0 | Status: SHIPPED | OUTPATIENT
Start: 2024-11-29

## 2024-11-29 RX ORDER — MEDROXYPROGESTERONE ACETATE 10 MG
TABLET ORAL
COMMUNITY
Start: 2024-09-11 | End: 2024-11-29

## 2024-11-29 ASSESSMENT — ENCOUNTER SYMPTOMS
MYALGIAS: 0
EYE DISCHARGE: 0
VOMITING: 0
WEIGHT LOSS: 0
EYE REDNESS: 0
NAUSEA: 0

## 2024-11-29 NOTE — PROGRESS NOTES
"Subjective     Sapna Patrick is a 24 y.o. female who presents with Cough (Pain when coughing and trying to catch breathe x5 days )            5 days painful nonproductive cough. No fever. Intermittent SOB/wheeze. No PMH asthma/pneumonia. ST. No nasal congestion. No other aggravating or alleviating factors.          Review of Systems   Constitutional:  Negative for malaise/fatigue and weight loss.   Eyes:  Negative for discharge and redness.   Gastrointestinal:  Negative for nausea and vomiting.   Musculoskeletal:  Negative for joint pain and myalgias.   Skin:  Negative for itching and rash.              Objective     /64 (BP Location: Right arm, Patient Position: Sitting, BP Cuff Size: Large adult)   Pulse 73   Temp 36.3 °C (97.3 °F) (Temporal)   Resp 18   Ht 1.676 m (5' 6\")   Wt 119 kg (262 lb)   SpO2 96%   BMI 42.29 kg/m²      Physical Exam  Constitutional:       General: She is not in acute distress.     Appearance: She is well-developed.   HENT:      Head: Normocephalic and atraumatic.   Eyes:      Conjunctiva/sclera: Conjunctivae normal.   Cardiovascular:      Rate and Rhythm: Normal rate and regular rhythm.      Heart sounds: Normal heart sounds. No murmur heard.  Pulmonary:      Effort: Pulmonary effort is normal.      Breath sounds: Normal breath sounds. No wheezing.   Skin:     General: Skin is warm and dry.      Findings: No rash.   Neurological:      Mental Status: She is alert.                             Assessment & Plan         1. Acute cough  promethazine-dextromethorphan (PROMETHAZINE-DM) 6.25-15 MG/5ML syrup      2. Wheeze  albuterol 108 (90 Base) MCG/ACT Aero Soln inhalation aerosol        Differential diagnosis, natural history, supportive care, and indications for immediate follow-up were discussed.      Shared decision to forego viral testing.  Follow-up as needed.          "

## 2024-12-04 ENCOUNTER — OFFICE VISIT (OUTPATIENT)
Dept: URGENT CARE | Facility: PHYSICIAN GROUP | Age: 24
End: 2024-12-04
Payer: COMMERCIAL

## 2024-12-04 VITALS
HEART RATE: 86 BPM | DIASTOLIC BLOOD PRESSURE: 76 MMHG | OXYGEN SATURATION: 99 % | BODY MASS INDEX: 41.91 KG/M2 | TEMPERATURE: 98.4 F | SYSTOLIC BLOOD PRESSURE: 114 MMHG | WEIGHT: 267 LBS | RESPIRATION RATE: 16 BRPM | HEIGHT: 67 IN

## 2024-12-04 DIAGNOSIS — R19.7 DIARRHEA, UNSPECIFIED TYPE: ICD-10-CM

## 2024-12-04 DIAGNOSIS — R11.2 NAUSEA AND VOMITING, UNSPECIFIED VOMITING TYPE: ICD-10-CM

## 2024-12-04 LAB
FLUAV RNA SPEC QL NAA+PROBE: NEGATIVE
FLUBV RNA SPEC QL NAA+PROBE: NEGATIVE
RSV RNA SPEC QL NAA+PROBE: NEGATIVE
SARS-COV-2 RNA RESP QL NAA+PROBE: NEGATIVE

## 2024-12-04 PROCEDURE — 3074F SYST BP LT 130 MM HG: CPT

## 2024-12-04 PROCEDURE — 0241U POCT CEPHEID COV-2, FLU A/B, RSV - PCR: CPT

## 2024-12-04 PROCEDURE — 99213 OFFICE O/P EST LOW 20 MIN: CPT

## 2024-12-04 PROCEDURE — 3078F DIAST BP <80 MM HG: CPT

## 2024-12-04 RX ORDER — LOPERAMIDE HYDROCHLORIDE 2 MG/1
CAPSULE ORAL
Qty: 20 CAPSULE | Refills: 0 | Status: SHIPPED | OUTPATIENT
Start: 2024-12-04

## 2024-12-04 RX ORDER — ONDANSETRON 4 MG/1
4 TABLET, ORALLY DISINTEGRATING ORAL ONCE
Status: COMPLETED | OUTPATIENT
Start: 2024-12-04 | End: 2024-12-04

## 2024-12-04 RX ADMIN — ONDANSETRON 4 MG: 4 TABLET, ORALLY DISINTEGRATING ORAL at 13:09

## 2024-12-04 ASSESSMENT — ENCOUNTER SYMPTOMS
DIARRHEA: 1
NAUSEA: 1
FEVER: 0
VOMITING: 1

## 2024-12-04 NOTE — PROGRESS NOTES
Verbal consent was acquired by the patient to use [employer] 2000 ambient listening note generation during this visit   Subjective:   Sapna Patrick is a 24 y.o. female who presents for Cough (Nausea, congested x 4 days pt was in on 11/29/2024 )      HPI:  History of Present Illness  This is a 24-year-old female who presents today for nausea, vomiting, diarrhea, and cough.  The patient reports that her symptoms have been present over the last 4 days.  Her son is also ill with similar symptoms.  She reports that she is unable to keep fluids down.  She denies any fevers.  She has attempted to take over-the-counter medications without any improvement in her symptoms.       Review of Systems   Constitutional:  Negative for fever.   Gastrointestinal:  Positive for diarrhea, nausea and vomiting.       Medications:    Current Outpatient Medications on File Prior to Visit   Medication Sig Dispense Refill    promethazine-dextromethorphan (PROMETHAZINE-DM) 6.25-15 MG/5ML syrup Take 5 mL by mouth 4 times a day as needed for Cough. 120 mL 0    Semaglutide,0.25 or 0.5MG/DOS, 2 MG/1.5ML Solution Pen-injector  (Patient not taking: Reported on 12/4/2024)      albuterol 108 (90 Base) MCG/ACT Aero Soln inhalation aerosol Inhale 2 Puffs every four hours as needed for Shortness of Breath. (Patient not taking: Reported on 12/4/2024) 1 Each 0     No current facility-administered medications on file prior to visit.        Allergies:   Patient has no known allergies.    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     Objective:     /76 (BP Location: Right arm, Patient Position: Sitting, BP Cuff Size: Adult)   Pulse 86   Temp 36.9 °C (98.4 °F) (Temporal)   Resp 16   Ht 1.702 m (5' 7\")   Wt 121 kg (267 lb)   SpO2 99%   BMI 41.82 kg/m²     Physical Exam  Vitals and nursing note reviewed.   Constitutional:       General: She is not in acute distress.     Appearance: Normal appearance. She is obese. She is ill-appearing. She is not toxic-appearing or diaphoretic.   HENT:      Head: Normocephalic and atraumatic.      Right Ear: Tympanic membrane, ear canal and external ear normal. There is no impacted cerumen.      Left Ear: Tympanic membrane, ear canal and external ear normal. There is no impacted cerumen.      Nose: Congestion present. No rhinorrhea.      Mouth/Throat:      Mouth: Mucous membranes are moist.      Pharynx: Oropharynx is clear. No oropharyngeal exudate or posterior oropharyngeal erythema.   Cardiovascular:      Rate and Rhythm: Normal rate and regular rhythm.      Pulses: Normal pulses.      Heart sounds: Normal heart sounds. No murmur heard.     No friction rub. No gallop.   Pulmonary:      Effort: Pulmonary effort is normal. No respiratory distress.      Breath sounds: Normal breath sounds. No stridor. No wheezing, rhonchi or rales.   Chest:      Chest wall: No tenderness.   Musculoskeletal:      Cervical back: Neck supple. No tenderness.   Lymphadenopathy:      Cervical: No cervical adenopathy.   Skin:     General: Skin is warm and dry.      Capillary Refill: Capillary refill takes less than 2 seconds.   Neurological:      General: No focal deficit present.      Mental Status: She is alert and oriented to person, place, and time. Mental status is at baseline.    "   Cranial Nerves: No cranial nerve deficit.      Motor: No weakness.      Gait: Gait normal.   Psychiatric:         Mood and Affect: Mood normal.         Behavior: Behavior normal.         Thought Content: Thought content normal.         Judgment: Judgment normal.         Assessment/Plan:     Diagnosis and associated orders:   1. Nausea and vomiting, unspecified vomiting type  - ondansetron (Zofran ODT) dispertab 4 mg  - POCT CoV-2, Flu A/B, RSV by PCR    2. Diarrhea, unspecified type  - POCT CoV-2, Flu A/B, RSV by PCR  - loperamide (IMODIUM) 2 MG Cap; Initial: 4 mg, followed by 2 mg after each loose stool; maximum: 16 mg/day  Dispense: 20 Capsule; Refill: 0    Results for orders placed or performed in visit on 12/04/24   POCT CoV-2, Flu A/B, RSV by PCR    Collection Time: 12/04/24  2:46 PM   Result Value Ref Range    SARS-CoV-2 by PCR Negative Negative, Invalid    Influenza virus A RNA Negative Negative, Invalid    Influenza virus B, PCR Negative Negative, Invalid    RSV, PCR Negative Negative, Invalid         Comments/MDM:   Pt is clinically stable at today's acute urgent care visit.  No acute distress noted. Appropriate for outpatient management at this time.     Assessment & Plan     The patient presents today for nausea, vomiting, diarrhea, and cough.  Her vital signs are stable, she is afebrile, SpO2 is 99% on room air, lung sounds clear to auscultation.  Viral testing for COVID, influenza, and RSV are all negative.  I do suspect the current infection is viral in etiology.  I have recommended staying hydrated, taking Zofran as needed for nausea and vomiting as prescribed, Imodium as prescribed for diarrhea, and increasing oral hydration.  I further recommended following a bland brat diet over the next few days.  The patient is to return for any worsening signs or symptoms or symptoms fail to improve.         Discussed DDx, management options (risks,benefits, and alternatives to planned treatment), natural  progression and supportive care.  Expressed understanding and the treatment plan was agreed upon. Questions were encouraged and answered   Return to urgent care prn if new or worsening sx or if there is no improvement in condition prn.    Educated in Red flags and indications to immediately call 911 or present to the Emergency Department.   Advised the patient to follow-up with the primary care physician for recheck, reevaluation, and consideration of further management.    I personally reviewed prior external notes and test results pertinent to today's visit.  I have independently reviewed and interpreted all diagnostics ordered during this urgent care acute visit.     Please note that this dictation was created using voice recognition software. I have made a reasonable attempt to correct obvious errors, but I expect that there are errors of grammar and possibly content that I did not discover before finalizing the note.    This note was electronically signed by CATERINA Urena

## 2024-12-05 RX ORDER — ONDANSETRON 4 MG/1
4 TABLET, ORALLY DISINTEGRATING ORAL EVERY 6 HOURS PRN
Qty: 15 TABLET | Refills: 0 | Status: SHIPPED | OUTPATIENT
Start: 2024-12-05

## (undated) DEVICE — SEAL 5MM-8MM UNIVERSAL  BOX OF 10

## (undated) DEVICE — SUTURE GENERAL

## (undated) DEVICE — SUTURE 2-0 20CM STRATAFIX SPIRAL SH NEEDLE (12/BX)

## (undated) DEVICE — BIPOLAR FORCE DA VINCI 12X'S REISABLE

## (undated) DEVICE — TUBING FILTER STRYKER

## (undated) DEVICE — TUBE CONNECTING SUCTION - CLEAR PLASTIC STERILE 72 IN (50EA/CA)

## (undated) DEVICE — DRAPE COLUMN  BOX OF 20

## (undated) DEVICE — SUTURE 4-0 MONOCRYL PLUS PS-2 - 27 INCH (36/BX)

## (undated) DEVICE — Device

## (undated) DEVICE — SHEARS MONOPOLAR CURVED  DA VINCI 10X'S REUSABLE

## (undated) DEVICE — CHLORAPREP 26 ML APPLICATOR - ORANGE TINT(25/CA)

## (undated) DEVICE — SUTURE 0 STRATRFIX SYMMETRIC PDS PLUS 30CM CT-1 (12EA/BX)

## (undated) DEVICE — SODIUM CHL IRRIGATION 0.9% 1000ML (12EA/CA)

## (undated) DEVICE — NEEDLE INSFL 120MM 14GA VRRS - (20/BX)

## (undated) DEVICE — TOWEL STOP TIMEOUT SAFETY FLAG (40EA/CA)

## (undated) DEVICE — GOWN WARMING STANDARD FLEX - (30/CA)

## (undated) DEVICE — OBTURATOR BLADELESS STANDARD 8MM (6EA/BX)

## (undated) DEVICE — SLEEVE VASO CALF MED - (10PR/CA)

## (undated) DEVICE — DRAPE ARM  BOX OF 20

## (undated) DEVICE — NEEDLE DRIVER MEGA SUTURECUT DA VINCI 15X'S REUSABLE

## (undated) DEVICE — ROBOTIC SURGERY SERVICES

## (undated) DEVICE — GLOVE BIOGEL INDICATOR SZ 7SURGICAL PF LTX - (50/BX 4BX/CA)

## (undated) DEVICE — COVER TIP ENDOWRIST HOT SHEAR - (10EA/BX) DA VINCI

## (undated) DEVICE — DERMABOND ADVANCED - (12EA/BX)

## (undated) DEVICE — GLOVE SZ 7 BIOGEL PI MICRO - PF LF (50PR/BX 4BX/CA)